# Patient Record
Sex: FEMALE | Race: WHITE | Employment: FULL TIME | URBAN - METROPOLITAN AREA
[De-identification: names, ages, dates, MRNs, and addresses within clinical notes are randomized per-mention and may not be internally consistent; named-entity substitution may affect disease eponyms.]

---

## 2018-05-29 ENCOUNTER — OFFICE VISIT (OUTPATIENT)
Dept: FAMILY MEDICINE CLINIC | Age: 38
End: 2018-05-29
Payer: MEDICAID

## 2018-05-29 ENCOUNTER — HOSPITAL ENCOUNTER (OUTPATIENT)
Age: 38
Discharge: HOME | End: 2018-05-31
Payer: MEDICAID

## 2018-05-29 VITALS
WEIGHT: 110 LBS | BODY MASS INDEX: 20.24 KG/M2 | RESPIRATION RATE: 16 BRPM | HEIGHT: 62 IN | HEART RATE: 63 BPM | DIASTOLIC BLOOD PRESSURE: 70 MMHG | OXYGEN SATURATION: 99 % | SYSTOLIC BLOOD PRESSURE: 112 MMHG

## 2018-05-29 DIAGNOSIS — K21.9 GASTROESOPHAGEAL REFLUX DISEASE, ESOPHAGITIS PRESENCE NOT SPECIFIED: ICD-10-CM

## 2018-05-29 DIAGNOSIS — Z00.00 PREVENTATIVE HEALTH CARE: Primary | ICD-10-CM

## 2018-05-29 DIAGNOSIS — E55.9 VITAMIN D DEFICIENCY: ICD-10-CM

## 2018-05-29 LAB
ALBUMIN SERPL-MCNC: 4.7 G/DL (ref 3.5–5.2)
ALP BLD-CCNC: 46 U/L (ref 35–104)
ALT SERPL-CCNC: 14 U/L (ref 0–32)
ANION GAP SERPL CALCULATED.3IONS-SCNC: 16 MMOL/L (ref 7–16)
AST SERPL-CCNC: 18 U/L (ref 0–31)
BILIRUB SERPL-MCNC: 0.3 MG/DL (ref 0–1.2)
BUN BLDV-MCNC: 8 MG/DL (ref 6–20)
CALCIUM SERPL-MCNC: 9.6 MG/DL (ref 8.6–10.2)
CHLORIDE BLD-SCNC: 102 MMOL/L (ref 98–107)
CHOLESTEROL, TOTAL: 195 MG/DL (ref 0–199)
CO2: 23 MMOL/L (ref 22–29)
CREAT SERPL-MCNC: 0.8 MG/DL (ref 0.5–1)
GFR AFRICAN AMERICAN: >60
GFR NON-AFRICAN AMERICAN: >60 ML/MIN/1.73
GLUCOSE BLD-MCNC: 90 MG/DL (ref 74–109)
HCT VFR BLD CALC: 43.3 % (ref 34–48)
HDLC SERPL-MCNC: 91 MG/DL
HEMOGLOBIN: 13.8 G/DL (ref 11.5–15.5)
LDL CHOLESTEROL CALCULATED: 95 MG/DL (ref 0–99)
MCH RBC QN AUTO: 26.6 PG (ref 26–35)
MCHC RBC AUTO-ENTMCNC: 31.9 % (ref 32–34.5)
MCV RBC AUTO: 83.6 FL (ref 80–99.9)
PDW BLD-RTO: 13.2 FL (ref 11.5–15)
PLATELET # BLD: 204 E9/L (ref 130–450)
PMV BLD AUTO: 11 FL (ref 7–12)
POTASSIUM SERPL-SCNC: 4.3 MMOL/L (ref 3.5–5)
RBC # BLD: 5.18 E12/L (ref 3.5–5.5)
SODIUM BLD-SCNC: 141 MMOL/L (ref 132–146)
TOTAL PROTEIN: 7.6 G/DL (ref 6.4–8.3)
TRIGL SERPL-MCNC: 43 MG/DL (ref 0–149)
VITAMIN D 25-HYDROXY: 14 NG/ML (ref 30–100)
VLDLC SERPL CALC-MCNC: 9 MG/DL
WBC # BLD: 4.5 E9/L (ref 4.5–11.5)

## 2018-05-29 PROCEDURE — 82306 VITAMIN D 25 HYDROXY: CPT

## 2018-05-29 PROCEDURE — 80061 LIPID PANEL: CPT

## 2018-05-29 PROCEDURE — 85027 COMPLETE CBC AUTOMATED: CPT

## 2018-05-29 PROCEDURE — 99395 PREV VISIT EST AGE 18-39: CPT | Performed by: FAMILY MEDICINE

## 2018-05-29 PROCEDURE — 80053 COMPREHEN METABOLIC PANEL: CPT

## 2018-05-29 PROCEDURE — 36415 COLL VENOUS BLD VENIPUNCTURE: CPT

## 2018-05-29 RX ORDER — OMEPRAZOLE 20 MG/1
20 CAPSULE, DELAYED RELEASE ORAL DAILY
Qty: 30 CAPSULE | Refills: 5 | Status: SHIPPED | OUTPATIENT
Start: 2018-05-29 | End: 2019-06-12 | Stop reason: SDUPTHER

## 2018-05-29 ASSESSMENT — PATIENT HEALTH QUESTIONNAIRE - PHQ9
SUM OF ALL RESPONSES TO PHQ QUESTIONS 1-9: 0
2. FEELING DOWN, DEPRESSED OR HOPELESS: 0
SUM OF ALL RESPONSES TO PHQ9 QUESTIONS 1 & 2: 0
1. LITTLE INTEREST OR PLEASURE IN DOING THINGS: 0

## 2018-05-29 ASSESSMENT — ENCOUNTER SYMPTOMS
SHORTNESS OF BREATH: 0
SORE THROAT: 0
WHEEZING: 0
DIARRHEA: 0
RHINORRHEA: 0
VOMITING: 0
ANAL BLEEDING: 0
COLOR CHANGE: 0
ABDOMINAL PAIN: 0
CONSTIPATION: 0
EYE PAIN: 0
BACK PAIN: 0
NAUSEA: 0
COUGH: 0
BLOOD IN STOOL: 0
EYE REDNESS: 0

## 2018-06-01 ENCOUNTER — TELEPHONE (OUTPATIENT)
Dept: FAMILY MEDICINE CLINIC | Age: 38
End: 2018-06-01

## 2018-06-01 DIAGNOSIS — K21.9 GASTROESOPHAGEAL REFLUX DISEASE WITHOUT ESOPHAGITIS: ICD-10-CM

## 2018-06-01 DIAGNOSIS — E55.9 VITAMIN D DEFICIENCY: ICD-10-CM

## 2018-06-01 RX ORDER — ACETAMINOPHEN 160 MG
TABLET,DISINTEGRATING ORAL DAILY
COMMUNITY
End: 2022-06-20

## 2019-06-12 ENCOUNTER — OFFICE VISIT (OUTPATIENT)
Dept: FAMILY MEDICINE CLINIC | Age: 39
End: 2019-06-12
Payer: MEDICAID

## 2019-06-12 VITALS
RESPIRATION RATE: 20 BRPM | DIASTOLIC BLOOD PRESSURE: 78 MMHG | BODY MASS INDEX: 20.61 KG/M2 | SYSTOLIC BLOOD PRESSURE: 122 MMHG | WEIGHT: 112 LBS | HEIGHT: 62 IN | HEART RATE: 72 BPM | OXYGEN SATURATION: 98 %

## 2019-06-12 DIAGNOSIS — Z00.00 PREVENTATIVE HEALTH CARE: Primary | ICD-10-CM

## 2019-06-12 DIAGNOSIS — K21.9 GASTROESOPHAGEAL REFLUX DISEASE, ESOPHAGITIS PRESENCE NOT SPECIFIED: ICD-10-CM

## 2019-06-12 DIAGNOSIS — E55.9 VITAMIN D DEFICIENCY: ICD-10-CM

## 2019-06-12 PROCEDURE — 99395 PREV VISIT EST AGE 18-39: CPT | Performed by: FAMILY MEDICINE

## 2019-06-12 RX ORDER — OMEPRAZOLE 20 MG/1
20 CAPSULE, DELAYED RELEASE ORAL DAILY
Qty: 30 CAPSULE | Refills: 5 | Status: SHIPPED
Start: 2019-06-12 | End: 2020-08-20 | Stop reason: SDUPTHER

## 2019-06-12 ASSESSMENT — PATIENT HEALTH QUESTIONNAIRE - PHQ9
SUM OF ALL RESPONSES TO PHQ QUESTIONS 1-9: 0
2. FEELING DOWN, DEPRESSED OR HOPELESS: 0
1. LITTLE INTEREST OR PLEASURE IN DOING THINGS: 0
SUM OF ALL RESPONSES TO PHQ9 QUESTIONS 1 & 2: 0
SUM OF ALL RESPONSES TO PHQ QUESTIONS 1-9: 0

## 2019-06-12 ASSESSMENT — ENCOUNTER SYMPTOMS
WHEEZING: 0
VOMITING: 0
ABDOMINAL PAIN: 0
COLOR CHANGE: 0
NAUSEA: 0
COUGH: 0
SHORTNESS OF BREATH: 0

## 2019-06-12 NOTE — PROGRESS NOTES
[facility]  [phone]   Sue Maloney MD     Patient: Anali Hendricks  YOB: 1980  Visit Date: 6/12/19    Jose Dailey is a 46y.o. year old female here today for   Chief Complaint   Patient presents with    Annual Exam       HPI  Patient is a 46year old female here for yearly physical.   No concerns. Had a tick a month ago. Very small tick. Still has a misty. No rash . No symptoms. GERD has been ok. Takes prilosec. Comes and goes. 2018 pap with Dr. Ed Box was normal.     Review of Systems   Constitutional: Negative for chills and fever. Respiratory: Negative for cough, shortness of breath and wheezing. Cardiovascular: Negative for chest pain, palpitations and leg swelling. Gastrointestinal: Negative for abdominal pain, nausea and vomiting. Genitourinary: Negative for dysuria and frequency. Musculoskeletal: Negative for arthralgias. Skin: Negative for color change and rash. Neurological: Negative for dizziness and light-headedness. Psychiatric/Behavioral: Negative for dysphoric mood and sleep disturbance. The patient is nervous/anxious. Current Outpatient Medications on File Prior to Visit   Medication Sig Dispense Refill    Cholecalciferol (VITAMIN D3) 2000 units CAPS Take by mouth daily       No current facility-administered medications on file prior to visit. Allergies   Allergen Reactions    Septra [Bactrim]     Sulfa Antibiotics Hives       Past medical, surgical, socialand/or family history reviewed, updated as needed, and are non-contributory (unless otherwise stated). Medications, allergies, and problem list also reviewed and updated as needed in patient's record.     Wt Readings from Last 3 Encounters:   06/12/19 112 lb (50.8 kg)   05/29/18 110 lb (49.9 kg)   09/21/16 140 lb (63.5 kg)                   /78   Pulse 72   Resp 20   Ht 5' 2\" (1.575 m)   Wt 112 lb (50.8 kg) LMP 06/11/2019   SpO2 98%   BMI 20.49 kg/m²        Physical Exam   Constitutional: She is oriented to person, place, and time. She appears well-developed and well-nourished. HENT:   Head: Normocephalic and atraumatic. Right Ear: External ear normal.   Left Ear: External ear normal.   Nose: Nose normal.   Mouth/Throat: Oropharynx is clear and moist. No oropharyngeal exudate. Eyes: Pupils are equal, round, and reactive to light. EOM are normal.   Cardiovascular: Normal rate, regular rhythm, normal heart sounds and intact distal pulses. Exam reveals no friction rub. No murmur heard. Pulmonary/Chest: Effort normal and breath sounds normal. No stridor. No respiratory distress. She has no wheezes. She has no rales. Abdominal: Soft. Bowel sounds are normal. She exhibits no distension and no mass. There is no tenderness. There is no guarding. Musculoskeletal: Normal range of motion. She exhibits no edema. Neurological: She is alert and oriented to person, place, and time. Skin: Skin is warm and dry. Psychiatric: She has a normal mood and affect. Her behavior is normal.   Nursing note and vitals reviewed. ASSESSMENT/PLAN  Annemarie Herrrea was seen today for annual exam.    Diagnoses and all orders for this visit:    Preventative health care   - 46year old female here for prev visit    Healthy   No issues or concerns. Up to date on pap smear. Gastroesophageal reflux disease, esophagitis presence not specified  -     omeprazole (PRILOSEC) 20 MG delayed release capsule; Take 1 capsule by mouth daily    Vitamin D deficiency  -     Vitamin D 25 Hydroxy; Future            Phone/MyChart follow up if tests abnormal.    Return in about 1 year (around 6/12/2020) for physical . or sooner if necessary. I have reviewed myfindings and recommendations with Annemarie Herrera. Yair Jaramillo.  Yanely Matthews M.D

## 2019-12-10 ASSESSMENT — ENCOUNTER SYMPTOMS
VOMITING: 0
SORE THROAT: 0
SHORTNESS OF BREATH: 0
EYE PAIN: 0
ABDOMINAL PAIN: 0
SINUS PRESSURE: 0
DIARRHEA: 0
BACK PAIN: 0
EYE REDNESS: 0
EYE DISCHARGE: 0
COUGH: 0

## 2019-12-11 ENCOUNTER — OFFICE VISIT (OUTPATIENT)
Dept: FAMILY MEDICINE CLINIC | Age: 39
End: 2019-12-11
Payer: COMMERCIAL

## 2019-12-11 ENCOUNTER — HOSPITAL ENCOUNTER (OUTPATIENT)
Age: 39
Discharge: HOME | End: 2019-12-13
Payer: COMMERCIAL

## 2019-12-11 VITALS
SYSTOLIC BLOOD PRESSURE: 136 MMHG | WEIGHT: 120 LBS | DIASTOLIC BLOOD PRESSURE: 80 MMHG | OXYGEN SATURATION: 96 % | BODY MASS INDEX: 22.08 KG/M2 | HEART RATE: 88 BPM | HEIGHT: 62 IN

## 2019-12-11 DIAGNOSIS — R53.83 FATIGUE, UNSPECIFIED TYPE: ICD-10-CM

## 2019-12-11 DIAGNOSIS — E55.9 VITAMIN D DEFICIENCY: ICD-10-CM

## 2019-12-11 DIAGNOSIS — F41.9 ANXIETY: ICD-10-CM

## 2019-12-11 DIAGNOSIS — F41.9 ANXIETY: Primary | ICD-10-CM

## 2019-12-11 DIAGNOSIS — R63.1 POLYDIPSIA: ICD-10-CM

## 2019-12-11 DIAGNOSIS — D64.9 ANEMIA, UNSPECIFIED TYPE: ICD-10-CM

## 2019-12-11 DIAGNOSIS — N92.6 IRREGULAR MENSES: ICD-10-CM

## 2019-12-11 DIAGNOSIS — G44.209 TENSION-TYPE HEADACHE, NOT INTRACTABLE, UNSPECIFIED CHRONICITY PATTERN: ICD-10-CM

## 2019-12-11 DIAGNOSIS — M79.10 MYALGIA: ICD-10-CM

## 2019-12-11 LAB
ALBUMIN SERPL-MCNC: 5 G/DL (ref 3.5–5.2)
ALP BLD-CCNC: 58 U/L (ref 35–104)
ALT SERPL-CCNC: 14 U/L (ref 0–32)
ANION GAP SERPL CALCULATED.3IONS-SCNC: 16 MMOL/L (ref 7–16)
AST SERPL-CCNC: 18 U/L (ref 0–31)
BASOPHILS ABSOLUTE: 0.05 E9/L (ref 0–0.2)
BASOPHILS RELATIVE PERCENT: 0.8 % (ref 0–2)
BILIRUB SERPL-MCNC: 0.3 MG/DL (ref 0–1.2)
BUN BLDV-MCNC: 9 MG/DL (ref 6–20)
CALCIUM SERPL-MCNC: 9.9 MG/DL (ref 8.6–10.2)
CHLORIDE BLD-SCNC: 104 MMOL/L (ref 98–107)
CHP ED QC CHECK: NORMAL
CO2: 21 MMOL/L (ref 22–29)
CREAT SERPL-MCNC: 0.8 MG/DL (ref 0.5–1)
EOSINOPHILS ABSOLUTE: 0.03 E9/L (ref 0.05–0.5)
EOSINOPHILS RELATIVE PERCENT: 0.5 % (ref 0–6)
FERRITIN: 21 NG/ML
FOLATE: 18.4 NG/ML (ref 4.8–24.2)
GFR AFRICAN AMERICAN: >60
GFR NON-AFRICAN AMERICAN: >60 ML/MIN/1.73
GLUCOSE BLD-MCNC: 87 MG/DL
GLUCOSE FASTING: 94 MG/DL (ref 74–99)
GONADOTROPIN, CHORIONIC (HCG) QUANT: <0.1 MIU/ML
HBA1C MFR BLD: 5 %
HCT VFR BLD CALC: 41.8 % (ref 34–48)
HEMOGLOBIN: 13.3 G/DL (ref 11.5–15.5)
IMMATURE GRANULOCYTES #: 0.02 E9/L
IMMATURE GRANULOCYTES %: 0.3 % (ref 0–5)
IRON SATURATION: 11 % (ref 15–50)
IRON: 42 MCG/DL (ref 37–145)
LYMPHOCYTES ABSOLUTE: 1.62 E9/L (ref 1.5–4)
LYMPHOCYTES RELATIVE PERCENT: 25.6 % (ref 20–42)
MAGNESIUM: 2.1 MG/DL (ref 1.6–2.6)
MCH RBC QN AUTO: 25.7 PG (ref 26–35)
MCHC RBC AUTO-ENTMCNC: 31.8 % (ref 32–34.5)
MCV RBC AUTO: 80.7 FL (ref 80–99.9)
MONOCYTES ABSOLUTE: 0.26 E9/L (ref 0.1–0.95)
MONOCYTES RELATIVE PERCENT: 4.1 % (ref 2–12)
NEUTROPHILS ABSOLUTE: 4.36 E9/L (ref 1.8–7.3)
NEUTROPHILS RELATIVE PERCENT: 68.7 % (ref 43–80)
PDW BLD-RTO: 12.7 FL (ref 11.5–15)
PLATELET # BLD: 295 E9/L (ref 130–450)
PMV BLD AUTO: 11 FL (ref 7–12)
POTASSIUM SERPL-SCNC: 3.7 MMOL/L (ref 3.5–5)
RBC # BLD: 5.18 E12/L (ref 3.5–5.5)
RHEUMATOID FACTOR: <10 IU/ML (ref 0–13)
SEDIMENTATION RATE, ERYTHROCYTE: 7 MM/HR (ref 0–20)
SODIUM BLD-SCNC: 141 MMOL/L (ref 132–146)
T4 FREE: 1.27 NG/DL (ref 0.93–1.7)
TOTAL IRON BINDING CAPACITY: 380 MCG/DL (ref 250–450)
TOTAL PROTEIN: 8.4 G/DL (ref 6.4–8.3)
TSH SERPL DL<=0.05 MIU/L-ACNC: 1.27 UIU/ML (ref 0.27–4.2)
VITAMIN B-12: 463 PG/ML (ref 211–946)
VITAMIN D 25-HYDROXY: 23 NG/ML (ref 30–100)
WBC # BLD: 6.3 E9/L (ref 4.5–11.5)

## 2019-12-11 PROCEDURE — 99214 OFFICE O/P EST MOD 30 MIN: CPT | Performed by: PHYSICIAN ASSISTANT

## 2019-12-11 PROCEDURE — 83036 HEMOGLOBIN GLYCOSYLATED A1C: CPT | Performed by: PHYSICIAN ASSISTANT

## 2019-12-11 PROCEDURE — 83540 ASSAY OF IRON: CPT

## 2019-12-11 PROCEDURE — 84702 CHORIONIC GONADOTROPIN TEST: CPT

## 2019-12-11 PROCEDURE — 85651 RBC SED RATE NONAUTOMATED: CPT

## 2019-12-11 PROCEDURE — 82306 VITAMIN D 25 HYDROXY: CPT

## 2019-12-11 PROCEDURE — 84443 ASSAY THYROID STIM HORMONE: CPT

## 2019-12-11 PROCEDURE — 82746 ASSAY OF FOLIC ACID SERUM: CPT

## 2019-12-11 PROCEDURE — 83550 IRON BINDING TEST: CPT

## 2019-12-11 PROCEDURE — 80053 COMPREHEN METABOLIC PANEL: CPT

## 2019-12-11 PROCEDURE — 82728 ASSAY OF FERRITIN: CPT

## 2019-12-11 PROCEDURE — 83735 ASSAY OF MAGNESIUM: CPT

## 2019-12-11 PROCEDURE — 86431 RHEUMATOID FACTOR QUANT: CPT

## 2019-12-11 PROCEDURE — 86038 ANTINUCLEAR ANTIBODIES: CPT

## 2019-12-11 PROCEDURE — 85025 COMPLETE CBC W/AUTO DIFF WBC: CPT

## 2019-12-11 PROCEDURE — 82962 GLUCOSE BLOOD TEST: CPT | Performed by: PHYSICIAN ASSISTANT

## 2019-12-11 PROCEDURE — 82607 VITAMIN B-12: CPT

## 2019-12-11 PROCEDURE — 84439 ASSAY OF FREE THYROXINE: CPT

## 2019-12-11 PROCEDURE — 36415 COLL VENOUS BLD VENIPUNCTURE: CPT

## 2019-12-11 RX ORDER — BUSPIRONE HYDROCHLORIDE 5 MG/1
5 TABLET ORAL 2 TIMES DAILY
Qty: 60 TABLET | Refills: 1 | Status: SHIPPED | OUTPATIENT
Start: 2019-12-11 | End: 2020-01-10

## 2019-12-11 RX ORDER — ESCITALOPRAM OXALATE 10 MG/1
10 TABLET ORAL DAILY
Qty: 30 TABLET | Refills: 1 | Status: SHIPPED | OUTPATIENT
Start: 2019-12-11 | End: 2022-07-13 | Stop reason: ALTCHOICE

## 2019-12-11 ASSESSMENT — ENCOUNTER SYMPTOMS
PHOTOPHOBIA: 0
TROUBLE SWALLOWING: 0
WHEEZING: 0
CHEST TIGHTNESS: 1
STRIDOR: 0
BLOOD IN STOOL: 0
COLOR CHANGE: 0
CONSTIPATION: 0
NAUSEA: 1

## 2019-12-12 LAB — ANTI-NUCLEAR ANTIBODY (ANA): NEGATIVE

## 2020-03-25 ENCOUNTER — PATIENT MESSAGE (OUTPATIENT)
Dept: FAMILY MEDICINE CLINIC | Age: 40
End: 2020-03-25

## 2020-03-25 NOTE — LETTER
[address]  [address] [id number] [place] [address]  Phone: [phone]  Fax: Bob Howard. MD Jake        March 26, 2020    Ignacio Lewis  [place] [id number]      To Whom It May Concern,     The above named patient is in  my care and should be excused from work until 3/30/2020 for health reasons. If you have any questions or concerns, please don't hesitate to call. Sincerely,        Dary Staton.  Torri Juarez MD

## 2020-03-25 NOTE — TELEPHONE ENCOUNTER
From: Rory Pryor  To: Stormy Ayala. Irma Cedeno MD  Sent: 3/25/2020 11:43 AM EDT  Subject: Non-Urgent Medical Question    I have been having worse and worse problems with my anxiety recently and now with this virus going around it has reached a point where I have been missing work. I'm sure its probably due to my anxiety, but I have been having headaches, stomachaches, insomnia and a hard time concentrating when I'm at work. I have been trying to stay away from people and at home as much as possible with eveything going on but I wasnt sure what to do about work because I have already used my sick days due to my anxiety and if I miss anymore days I need a doctor's excuse. Is there anyway I could get an excuse for the rest of the week for work to have time to try to get my anxiety under control? Thank you.

## 2022-06-20 ENCOUNTER — OFFICE VISIT (OUTPATIENT)
Dept: FAMILY MEDICINE CLINIC | Age: 42
End: 2022-06-20
Payer: MEDICAID

## 2022-06-20 VITALS
HEART RATE: 69 BPM | HEIGHT: 63 IN | OXYGEN SATURATION: 100 % | WEIGHT: 105 LBS | RESPIRATION RATE: 16 BRPM | BODY MASS INDEX: 18.61 KG/M2 | DIASTOLIC BLOOD PRESSURE: 73 MMHG | TEMPERATURE: 97.7 F | SYSTOLIC BLOOD PRESSURE: 111 MMHG

## 2022-06-20 DIAGNOSIS — K21.9 GASTROESOPHAGEAL REFLUX DISEASE, UNSPECIFIED WHETHER ESOPHAGITIS PRESENT: ICD-10-CM

## 2022-06-20 DIAGNOSIS — E55.9 VITAMIN D DEFICIENCY: ICD-10-CM

## 2022-06-20 DIAGNOSIS — R00.2 PALPITATIONS: ICD-10-CM

## 2022-06-20 DIAGNOSIS — R00.2 PALPITATIONS: Primary | ICD-10-CM

## 2022-06-20 LAB
ALBUMIN SERPL-MCNC: 5 G/DL (ref 3.5–5.2)
ALP BLD-CCNC: 57 U/L (ref 35–104)
ALT SERPL-CCNC: 14 U/L (ref 0–32)
ANION GAP SERPL CALCULATED.3IONS-SCNC: 17 MMOL/L (ref 7–16)
AST SERPL-CCNC: 18 U/L (ref 0–31)
BASOPHILS ABSOLUTE: 0.03 E9/L (ref 0–0.2)
BASOPHILS RELATIVE PERCENT: 0.7 % (ref 0–2)
BILIRUB SERPL-MCNC: 0.4 MG/DL (ref 0–1.2)
BUN BLDV-MCNC: 7 MG/DL (ref 6–20)
CALCIUM SERPL-MCNC: 9.4 MG/DL (ref 8.6–10.2)
CHLORIDE BLD-SCNC: 103 MMOL/L (ref 98–107)
CHOLESTEROL, TOTAL: 205 MG/DL (ref 0–199)
CO2: 21 MMOL/L (ref 22–29)
CREAT SERPL-MCNC: 0.9 MG/DL (ref 0.5–1)
EOSINOPHILS ABSOLUTE: 0.04 E9/L (ref 0.05–0.5)
EOSINOPHILS RELATIVE PERCENT: 1 % (ref 0–6)
GFR AFRICAN AMERICAN: >60
GFR NON-AFRICAN AMERICAN: >60 ML/MIN/1.73
GLUCOSE BLD-MCNC: 85 MG/DL (ref 74–99)
HCT VFR BLD CALC: 44.8 % (ref 34–48)
HDLC SERPL-MCNC: 83 MG/DL
HEMOGLOBIN: 14.2 G/DL (ref 11.5–15.5)
IMMATURE GRANULOCYTES #: 0.01 E9/L
IMMATURE GRANULOCYTES %: 0.2 % (ref 0–5)
LDL CHOLESTEROL CALCULATED: 109 MG/DL (ref 0–99)
LYMPHOCYTES ABSOLUTE: 1.37 E9/L (ref 1.5–4)
LYMPHOCYTES RELATIVE PERCENT: 33.1 % (ref 20–42)
MCH RBC QN AUTO: 25.9 PG (ref 26–35)
MCHC RBC AUTO-ENTMCNC: 31.7 % (ref 32–34.5)
MCV RBC AUTO: 81.8 FL (ref 80–99.9)
MONOCYTES ABSOLUTE: 0.21 E9/L (ref 0.1–0.95)
MONOCYTES RELATIVE PERCENT: 5.1 % (ref 2–12)
NEUTROPHILS ABSOLUTE: 2.48 E9/L (ref 1.8–7.3)
NEUTROPHILS RELATIVE PERCENT: 59.9 % (ref 43–80)
PDW BLD-RTO: 12.9 FL (ref 11.5–15)
PLATELET # BLD: 243 E9/L (ref 130–450)
PMV BLD AUTO: 11.5 FL (ref 7–12)
POTASSIUM SERPL-SCNC: 3.6 MMOL/L (ref 3.5–5)
RBC # BLD: 5.48 E12/L (ref 3.5–5.5)
SODIUM BLD-SCNC: 141 MMOL/L (ref 132–146)
TOTAL PROTEIN: 8.1 G/DL (ref 6.4–8.3)
TRIGL SERPL-MCNC: 66 MG/DL (ref 0–149)
TSH SERPL DL<=0.05 MIU/L-ACNC: 0.14 UIU/ML (ref 0.27–4.2)
VITAMIN D 25-HYDROXY: 23 NG/ML (ref 30–100)
VLDLC SERPL CALC-MCNC: 13 MG/DL
WBC # BLD: 4.1 E9/L (ref 4.5–11.5)

## 2022-06-20 PROCEDURE — 36415 COLL VENOUS BLD VENIPUNCTURE: CPT | Performed by: FAMILY MEDICINE

## 2022-06-20 PROCEDURE — 93010 ELECTROCARDIOGRAM REPORT: CPT | Performed by: FAMILY MEDICINE

## 2022-06-20 PROCEDURE — 99212 OFFICE O/P EST SF 10 MIN: CPT | Performed by: STUDENT IN AN ORGANIZED HEALTH CARE EDUCATION/TRAINING PROGRAM

## 2022-06-20 PROCEDURE — G8420 CALC BMI NORM PARAMETERS: HCPCS | Performed by: FAMILY MEDICINE

## 2022-06-20 PROCEDURE — 99213 OFFICE O/P EST LOW 20 MIN: CPT | Performed by: FAMILY MEDICINE

## 2022-06-20 PROCEDURE — 1036F TOBACCO NON-USER: CPT | Performed by: FAMILY MEDICINE

## 2022-06-20 PROCEDURE — 93005 ELECTROCARDIOGRAM TRACING: CPT | Performed by: STUDENT IN AN ORGANIZED HEALTH CARE EDUCATION/TRAINING PROGRAM

## 2022-06-20 PROCEDURE — G8427 DOCREV CUR MEDS BY ELIG CLIN: HCPCS | Performed by: FAMILY MEDICINE

## 2022-06-20 RX ORDER — OMEPRAZOLE 20 MG/1
20 CAPSULE, DELAYED RELEASE ORAL DAILY
Qty: 30 CAPSULE | Refills: 3 | Status: SHIPPED | OUTPATIENT
Start: 2022-06-20

## 2022-06-20 SDOH — ECONOMIC STABILITY: FOOD INSECURITY: WITHIN THE PAST 12 MONTHS, THE FOOD YOU BOUGHT JUST DIDN'T LAST AND YOU DIDN'T HAVE MONEY TO GET MORE.: NEVER TRUE

## 2022-06-20 SDOH — ECONOMIC STABILITY: FOOD INSECURITY: WITHIN THE PAST 12 MONTHS, YOU WORRIED THAT YOUR FOOD WOULD RUN OUT BEFORE YOU GOT MONEY TO BUY MORE.: NEVER TRUE

## 2022-06-20 ASSESSMENT — PATIENT HEALTH QUESTIONNAIRE - PHQ9
SUM OF ALL RESPONSES TO PHQ QUESTIONS 1-9: 0
SUM OF ALL RESPONSES TO PHQ QUESTIONS 1-9: 0
2. FEELING DOWN, DEPRESSED OR HOPELESS: 0
1. LITTLE INTEREST OR PLEASURE IN DOING THINGS: 0
SUM OF ALL RESPONSES TO PHQ9 QUESTIONS 1 & 2: 0
SUM OF ALL RESPONSES TO PHQ QUESTIONS 1-9: 0
SUM OF ALL RESPONSES TO PHQ QUESTIONS 1-9: 0

## 2022-06-20 ASSESSMENT — LIFESTYLE VARIABLES: HOW OFTEN DO YOU HAVE A DRINK CONTAINING ALCOHOL: NEVER

## 2022-06-20 ASSESSMENT — SOCIAL DETERMINANTS OF HEALTH (SDOH): HOW HARD IS IT FOR YOU TO PAY FOR THE VERY BASICS LIKE FOOD, HOUSING, MEDICAL CARE, AND HEATING?: SOMEWHAT HARD

## 2022-06-20 NOTE — PROGRESS NOTES
[address] [facility]  FAMILY MEDICINE RESIDENCY PROGRAM  DATE OF VISIT : 2022    Patient : Brian Ferreira   Age : 40 y.o.  : 1980   MRN : [id number]   ______________________________________________________________________    Chief Complaint :   Chief Complaint   Patient presents with    Gastroesophageal Reflux    Palpitations     with chest tightness       HPI : Brian Ferreira is 40 y.o. female who presented to the clinic today for GERD follow up and palpitations with chest tightness. Hx of GERD, has not taken omeprazole for about a week. Has noted \"weird\" sensation in her chest that she thought maybe attributable to reflux. Did have reflux last night. Denies post prandial and nighttime cough. Has had this sensation for about a week and a half, often in the morning. Slight chest tightness with breathing. Notes she has also been doing more house work, so thought it could be related to MSK. Chest discomfort started after palpitations began about 2 weeks ago. Has had prior palpitations, but notes that these felt a little different than prior and lasts for a couple minutes at a time. Does not occur every day, and is sporadic without trigger. Denies other SOB, chest heaviness/pain, cough dizziness, or headaches. Past Medical History :  Past Medical History:   Diagnosis Date    Acid reflux     Anxiety     Vitamin D deficiency 2018     Past Surgical History:   Procedure Laterality Date    TONSILLECTOMY         Allergies :    Allergies   Allergen Reactions    Septra [Bactrim]     Sulfa Antibiotics Hives       Medication List :    Current Outpatient Medications   Medication Sig Dispense Refill    omeprazole (PRILOSEC) 20 MG delayed release capsule Take 1 capsule by mouth daily 30 capsule 3    Multiple Vitamin (MULTI-VITAMIN DAILY PO) Take by mouth      escitalopram (LEXAPRO) 10 MG tablet Take 1 tablet by mouth daily 30 tablet 1     No current facility-administered medications for this visit.        ______________________________________________________________________    Physical Exam :    Vitals: /73   Pulse 69   Temp 97.7 °F (36.5 °C) (Temporal)   Resp 16   Ht 5' 3\" (1.6 m)   Wt 105 lb (47.6 kg)   LMP 06/15/2022 (Exact Date)   SpO2 100%   BMI 18.60 kg/m²   General Appearance: Awake, alert, oriented, and in NAD  HEENT: NCAT, no pallor or icterus  Neck: Symmetrical, trachea midline. No thyroid nodules appreciated  Chest wall/Lung: CTAB, respirations unlabored. No ronchi/wheezing/rales; no reproducible chest discomfort on exam  Heart: RRR, normal S1 and S2, no murmurs, rubs or gallops  Abdomen: SNTND  Extremities: Extremities normal, atraumatic, no cyanosis, clubbing or edema. Neurologic: Alert&Oriented x3. No focal motor deficits detected   Psychiatric: Normal mood. Normal affect. Normal behavior  ______________________________________________________________________    Assessment & Plan :    1. Gastroesophageal reflux disease  · Refill:  - omeprazole (PRILOSEC) 20 MG delayed release capsule; Take 1 capsule by mouth daily  Dispense: 30 capsule; Refill: 3  - CBC with Auto Differential; Future  - Comprehensive Metabolic Panel; Future  - LIPID PANEL; Future    2. Palpitations  · Shortened VA interval on EKG, 24 holter monitor, labs  - CBC with Auto Differential; Future  - Comprehensive Metabolic Panel; Future  - TSH; Future  - EKG 12 lead showed shortened VA interval, no changes to QRS complex. - 24 hour Holter monitor    3. Vitamin D deficiency  - Vitamin D 25 Hydroxy; Future      Additional plan and future considerations:       Return to Office: Return in about 4 weeks (around 7/18/2022) for GERD, palpitations.     Alger Soulier, DO   Case discussed with Dr. Diedra Rubinstein

## 2022-06-20 NOTE — PROGRESS NOTES
S: 40 y.o. female presents today for Gastroesophageal Reflux and Palpitations (with chest tightness)    GERD: on omeprazole; requesting refill  1 week chest tightness, \"weird with breathing\", out of omeprazole; non-exertional symptoms; palpitations started about 2 weeks ago, felt like heart flipping; sporadic and resolves in minutes  Father with thyroid disease      O: VS: /73   Pulse 69   Temp 97.7 °F (36.5 °C) (Temporal)   Resp 16   Ht 5' 3\" (1.6 m)   Wt 105 lb (47.6 kg)   LMP 06/15/2022 (Exact Date)   SpO2 100%   BMI 18.60 kg/m²   AAO/NAD, appropriate affect for mood  CV:  RRR, no murmur  Resp: CTAB  Abdomen: SNTND  Ext: no edema    Assessment/Plan:   1) GERD - restart omeprazole  2) Palpitations - EKG today, obtain Holter; labs as ordered  3) Chest tightness - EKG today sinus rhythm, short pr; labs as ordered; non-anginal chest pain; [place] Heart Score (MHS):  1; Pretest probability: Very Low; continue with symptom monitoring; resume omeprazole at this time  4) HM as ordered  RTO: 1 month f/u    Attending Physician Statement  I have discussed the case, including pertinent history and exam findings with the resident. I agree with the documented assessment and plan.       Electronically signed by Eva Vee MD on 6/20/2022 at 1:43 PM

## 2022-06-22 NOTE — RESULT ENCOUNTER NOTE
TSH 0.135, previously normal. No known thyroid hx. Will need repeat TSH with T4. Vit D low at 23, consider OTC Vit D supplementation. Cholesterol 205, , HDL 83    The 10-year ASCVD risk score (Angela Montiel et al., 2013) is: 0.2%    Values used to calculate the score:      Age: 40 years      Sex: Female      Is Non- : No      Diabetic: No      Tobacco smoker: No      Systolic Blood Pressure: 983 mmHg      Is BP treated: No      HDL Cholesterol: 83 mg/dL      Total Cholesterol: 205 mg/dL   Consider dietary changes. WBC 4.1, continue to monitor. Attempted call to patient, voicemail name did not match contacts.

## 2022-07-11 ENCOUNTER — HOSPITAL ENCOUNTER (OUTPATIENT)
Dept: SLEEP CENTER | Age: 42
Discharge: HOME | End: 2022-07-11
Payer: MEDICAID

## 2022-07-11 DIAGNOSIS — R00.2 PALPITATIONS: ICD-10-CM

## 2022-07-11 PROCEDURE — 93226 XTRNL ECG REC<48 HR SCAN A/R: CPT

## 2022-07-11 PROCEDURE — 93225 XTRNL ECG REC<48 HRS REC: CPT

## 2022-07-13 ENCOUNTER — OFFICE VISIT (OUTPATIENT)
Dept: FAMILY MEDICINE CLINIC | Age: 42
End: 2022-07-13
Payer: MEDICAID

## 2022-07-13 VITALS
HEART RATE: 102 BPM | HEIGHT: 62 IN | OXYGEN SATURATION: 100 % | BODY MASS INDEX: 19.14 KG/M2 | DIASTOLIC BLOOD PRESSURE: 60 MMHG | TEMPERATURE: 97.9 F | WEIGHT: 104 LBS | SYSTOLIC BLOOD PRESSURE: 98 MMHG | RESPIRATION RATE: 16 BRPM

## 2022-07-13 DIAGNOSIS — R94.31 SHORTENED PR INTERVAL: ICD-10-CM

## 2022-07-13 DIAGNOSIS — Z11.59 ENCOUNTER FOR HEPATITIS C SCREENING TEST FOR LOW RISK PATIENT: ICD-10-CM

## 2022-07-13 DIAGNOSIS — R00.2 PALPITATIONS: Primary | ICD-10-CM

## 2022-07-13 DIAGNOSIS — Z11.4 ENCOUNTER FOR SCREENING FOR HIV: ICD-10-CM

## 2022-07-13 DIAGNOSIS — R00.2 PALPITATIONS: ICD-10-CM

## 2022-07-13 PROCEDURE — 1036F TOBACCO NON-USER: CPT | Performed by: FAMILY MEDICINE

## 2022-07-13 PROCEDURE — 36415 COLL VENOUS BLD VENIPUNCTURE: CPT | Performed by: FAMILY MEDICINE

## 2022-07-13 PROCEDURE — 99213 OFFICE O/P EST LOW 20 MIN: CPT | Performed by: FAMILY MEDICINE

## 2022-07-13 PROCEDURE — G8420 CALC BMI NORM PARAMETERS: HCPCS | Performed by: FAMILY MEDICINE

## 2022-07-13 PROCEDURE — G8427 DOCREV CUR MEDS BY ELIG CLIN: HCPCS | Performed by: FAMILY MEDICINE

## 2022-07-13 NOTE — Clinical Note
Hello! This chart is still open since July. Please let me know of any issues in getting this closed. Thank you!

## 2022-07-13 NOTE — PROGRESS NOTES
[facility] [facility]  FAMILY MEDICINE RESIDENCY PROGRAM  DATE OF VISIT : 7/15/2022    Patient : Julieth Styles   Age : 40 y.o.  : 1980   MRN : [id number]   ______________________________________________________________________    Chief Complaint :   Chief Complaint   Patient presents with    Results     labs       HPI : Julieth Styles is 40 y.o. female who presented to the clinic today for GERD follow up and palpitations with chest tightness. Hx of GERD, has been back on her PPI now. Symptoms improved. Prior: Had noted \"weird\" sensation in her chest that she thought maybe attributable to reflux. Did have reflux sx. Denies post prandial and nighttime cough. No further chest discomfort. Had 24 hour holter but didn't have symptoms during the test. Still having nearly daily palpitations. Significant family history of thyroid disorder. Does feel on edge and sweaty often. Has had prior palpitations, but notes that these felt a little different than prior and lasts for a couple minutes at a time. Does not occur every day, and is sporadic without trigger. Denies other SOB, chest heaviness/pain, cough dizziness, or headaches. Past Medical History :  Past Medical History:   Diagnosis Date    Acid reflux     Anxiety     Vitamin D deficiency 2018     Past Surgical History:   Procedure Laterality Date    TONSILLECTOMY         Allergies :    Allergies   Allergen Reactions    Septra [Bactrim]     Sulfa Antibiotics Hives       Medication List :    Current Outpatient Medications   Medication Sig Dispense Refill    omeprazole (PRILOSEC) 20 MG delayed release capsule Take 1 capsule by mouth daily 30 capsule 3    Multiple Vitamin (MULTI-VITAMIN DAILY PO) Take by mouth       No current facility-administered medications for this visit.        ______________________________________________________________________    Physical Exam :    Vitals: BP 98/60   Pulse (!) 102   Temp 97.9 °F (36.6 °C) (Temporal)   Resp 16   Ht 5' 2\" (1.575 m)   Wt 104 lb (47.2 kg)   LMP 07/08/2022 (Exact Date)   SpO2 100%   BMI 19.02 kg/m²   General Appearance: Awake, alert, oriented, and in NAD  HEENT: NCAT, no pallor or icterus  Neck: Symmetrical, trachea midline. No thyroid nodules appreciated  Chest wall/Lung: CTAB, respirations unlabored. No ronchi/wheezing/rales; no reproducible chest discomfort on exam  Heart: RRR, normal S1 and S2, no murmurs, rubs or gallops  Abdomen: SNTND  Extremities: Extremities normal, atraumatic, no cyanosis, clubbing or edema. Neurologic: Alert&Oriented x3. No focal motor deficits detected   Psychiatric: Normal mood. Normal affect. Normal behavior  ______________________________________________________________________    Assessment & Plan :    1. Gastroesophageal reflux disease  Refill:  - omeprazole (PRILOSEC) 20 MG delayed release capsule; Take 1 capsule by mouth daily  Dispense: 30 capsule; Refill: 3    2. Palpitations  Shortened NH interval on EKG, 24 holter monitor, labs  - TSH; low with normal T4  - EKG 12 lead showed shortened NH interval, no changes to QRS complex. - 24 hour Holter monitor read pending  Referral to cardio given symptoms and likely need for >24 hr holter        Additional plan and future considerations:       Return to Office: Return in about 6 weeks (around 8/24/2022).     Lary Navarro MD   Case discussed with Dr. Obdulia Lopez

## 2022-07-13 NOTE — PROGRESS NOTES
S: 40 y.o. female presents today for:   GERD and lab follow-up. Palpitations intermittently. No chest pain. Did Holter monitor. Awaiting results. TSH low. Family history of thyroid disease. O: VS: BP 98/60   Pulse (!) 102   Temp 97.9 °F (36.6 °C) (Temporal)   Resp 16   Ht 5' 2\" (1.575 m)   Wt 104 lb (47.2 kg)   LMP 07/08/2022 (Exact Date)   SpO2 100%   BMI 19.02 kg/m²   AAO/NAD, appropriate affect for mood  CV:  RRR, no murmur  Resp: CTAB  Ext- DPP-B, no clubbing, cyanosis, edema    Impression/Plan:   1) GERD- stable  2) palpitations/low TSH- repeat TSH, T4, await Holter  3) short IL- refer to Cardio    Attending Physician Statement  I have discussed the case, including pertinent history and exam findings with the resident. I agree with the documented assessment and plan.       Kimberlyn Machuca, DO

## 2022-07-14 ENCOUNTER — TELEPHONE (OUTPATIENT)
Dept: ADMINISTRATIVE | Age: 42
End: 2022-07-14

## 2022-07-14 LAB
HEPATITIS C ANTIBODY INTERPRETATION: NORMAL
HIV-1 AND HIV-2 ANTIBODIES: NORMAL
T4 FREE: 1.26 NG/DL (ref 0.93–1.7)
TSH SERPL DL<=0.05 MIU/L-ACNC: 0.17 UIU/ML (ref 0.27–4.2)

## 2022-07-14 NOTE — TELEPHONE ENCOUNTER
NP scheduled from the [facility]. Patient Appointment Form:      PCP: Dr. Poly Kamara    Referring: Dr. Jay Mello     Has the Patient:    Seen a Cardiologist? No    Had a heart catheterization? no    Had heart surgery? no    Had a stress test or nuclear stress test? no    Had an echocardiogram? no    Had a vascular ultrasound? no    Had a 24/48 heart monitor or extended cardiac event monitor? Yes 7/11/22 24 HR [facility]     Had recent blood work in the last 6 months? Yes 6/20/22 [facility] PCP     Had a pacemaker/ICD/ILR implant? no    Seen an Electrophysiologist? no        Will send records via: Prior HM in Epic - PCP recs in Epic - no other recs/or hx.         Date & time of appointment: 8/9/22 @ 8:30 with Dr. Rachelle Michelle - \"Julio C\"

## 2022-07-15 ENCOUNTER — TELEPHONE (OUTPATIENT)
Dept: FAMILY MEDICINE CLINIC | Age: 42
End: 2022-07-15

## 2022-07-15 DIAGNOSIS — R79.89 LOW TSH LEVEL: Primary | ICD-10-CM

## 2022-07-15 NOTE — TELEPHONE ENCOUNTER
Notified patient of her lab results and  I explained to patient about the us and more labs  Patient agreed and I gave her the number to schedule the US and she will have her labs done with the [facility].

## 2022-07-15 NOTE — TELEPHONE ENCOUNTER
Will check thyroid antibodies and ultrasound. Labs overall seem subclinical, but given symptoms and family history, will check to autoimmune cause.     Electronically signed by Ria Mendoza MD on 7/15/22 at 1:56 PM EDT

## 2022-07-23 ENCOUNTER — HOSPITAL ENCOUNTER (OUTPATIENT)
Dept: ULTRASOUND IMAGING | Age: 42
Discharge: HOME | End: 2022-07-23
Payer: MEDICAID

## 2022-07-23 DIAGNOSIS — R79.89 LOW TSH LEVEL: ICD-10-CM

## 2022-07-23 PROCEDURE — 76536 US EXAM OF HEAD AND NECK: CPT

## 2022-08-09 ENCOUNTER — OFFICE VISIT (OUTPATIENT)
Dept: CARDIOLOGY CLINIC | Age: 42
End: 2022-08-09
Payer: MEDICAID

## 2022-08-09 VITALS
SYSTOLIC BLOOD PRESSURE: 104 MMHG | HEART RATE: 62 BPM | RESPIRATION RATE: 16 BRPM | DIASTOLIC BLOOD PRESSURE: 58 MMHG | BODY MASS INDEX: 17.89 KG/M2 | HEIGHT: 63 IN | OXYGEN SATURATION: 98 % | WEIGHT: 101 LBS

## 2022-08-09 DIAGNOSIS — R79.89 LOW TSH LEVEL: ICD-10-CM

## 2022-08-09 DIAGNOSIS — I51.9 HEART PROBLEM: ICD-10-CM

## 2022-08-09 DIAGNOSIS — R00.2 PALPITATIONS: Primary | ICD-10-CM

## 2022-08-09 DIAGNOSIS — E78.49 OTHER HYPERLIPIDEMIA: ICD-10-CM

## 2022-08-09 PROCEDURE — 93000 ELECTROCARDIOGRAM COMPLETE: CPT | Performed by: INTERNAL MEDICINE

## 2022-08-09 PROCEDURE — G8427 DOCREV CUR MEDS BY ELIG CLIN: HCPCS | Performed by: INTERNAL MEDICINE

## 2022-08-09 PROCEDURE — G8419 CALC BMI OUT NRM PARAM NOF/U: HCPCS | Performed by: INTERNAL MEDICINE

## 2022-08-09 PROCEDURE — 1036F TOBACCO NON-USER: CPT | Performed by: INTERNAL MEDICINE

## 2022-08-09 PROCEDURE — 99204 OFFICE O/P NEW MOD 45 MIN: CPT | Performed by: INTERNAL MEDICINE

## 2022-08-09 NOTE — PROGRESS NOTES
CHIEF COMPLAINT:   Chief Complaint   Patient presents with    Heart Problem     NP per Elliott DX palpitations. Patient has no complaints. HISTORY OF PRESENT ILLNESS: Patient is a 40 y.o. female seen at the request of Azul Joseph MD to establish care with me. She has a history of GERD, no prior significant cardiac history, anxiety not on medications. She has been referred to me for further evaluation of the above-mentioned complaints. She has been having palpitations for 2 months now. She describes it as a skipped beat. Occasional fluttering sensation. Not associated with dizziness, lightheadedness falls or loss of consciousness. At today's office visit, She denies any chest pain, shortness of breath,  pedal edema. The patient is capable of activities of daily living. There is dyspnea on more than moderate exertion. There is no orthopnea or PND. She is compliant with medications as well as diet and exercise regimen. Past Medical History:   Diagnosis Date    Acid reflux     Anxiety     Other hyperlipidemia 8/14/2022    Vitamin D deficiency 6/1/2018       Allergies   Allergen Reactions    Septra [Bactrim]     Sulfa Antibiotics Hives       Current Outpatient Medications   Medication Sig Dispense Refill    omeprazole (PRILOSEC) 20 MG delayed release capsule Take 1 capsule by mouth daily 30 capsule 3    Multiple Vitamin (MULTI-VITAMIN DAILY PO) Take by mouth       No current facility-administered medications for this visit.        Social History     Socioeconomic History    Marital status: Single     Spouse name: Not on file    Number of children: Not on file    Years of education: Not on file    Highest education level: Not on file   Occupational History    Not on file   Tobacco Use    Smoking status: Never    Smokeless tobacco: Never   Substance and Sexual Activity    Alcohol use: No     Comment: (1/26/16):  no EtOH    Drug use: No    Sexual activity: Yes     Partners: Male Comment: (1/26/16)monogamous relationship since 1998; condoms. not . have been together for 20 years    Other Topics Concern    Not on file   Social History Narrative    Not on file     Social Determinants of Health     Financial Resource Strain: Medium Risk    Difficulty of Paying Living Expenses: Somewhat hard   Food Insecurity: No Food Insecurity    Worried About Running Out of Food in the Last Year: Never true    Ran Out of Food in the Last Year: Never true   Transportation Needs: Not on file   Physical Activity: Not on file   Stress: Not on file   Social Connections: Not on file   Intimate Partner Violence: Not on file   Housing Stability: Not on file       Family History   Problem Relation Age of Onset    Cancer Mother         lung ca    High Cholesterol Mother     Depression Mother     Anxiety Disorder Mother     Substance Abuse Mother         tobacco    High Blood Pressure Mother     Cancer Father         prostate    Thyroid Disease Father     Anxiety Disorder Sister     No Known Problems Sister     Heart Disease Maternal Grandfather     No Known Problems Paternal Grandmother     No Known Problems Paternal Grandfather        Review of Systems  Constitutional: Negative for fever, malaise/fatigue and weight loss. HENT: Negative for sore throat and tinnitus. Eyes: Negative for blurred vision and double vision. Respiratory: Negative for shortness of breath. Negative for cough and wheezing. Cardiovascular: As mentioned in HPI. Gastrointestinal: Negative for abdominal pain, heartburn, nausea and vomiting. Genitourinary: Negative. Musculoskeletal: Negative for back pain, joint pain and myalgias. Neurological: Negative for dizziness, tremors, loss of consciousness and headaches. Endo/Heme/Allergies: Negative. Psychiatric/Behavioral: Negative for depression and suicidal ideas.     Physical Exam   BP (!) 104/58   Pulse 62   Resp 16   Ht 5' 3\" (1.6 m)   Wt 101 lb (45.8 kg)   SpO2 98% BMI 17.89 kg/m²   Constitutional: Oriented to person, place, and time. Well-developed and well-nourished. No distress. Head: Normocephalic and atraumatic. Eyes: EOM are normal. Pupils are equal, round, and reactive to light. Neck: Normal range of motion. Neck supple. No hepatojugular reflux and no JVD present. Carotid bruit is not present. No tracheal deviation present. No thyromegaly present. Cardiovascular: Normal rate, regular rhythm, normal heart sounds and intact distal pulses. Exam reveals no gallop and no friction rub. No murmur heard. Pulmonary/Chest: Effort normal and breath sounds normal. No respiratory distress. No wheezes. No rales. No tenderness. Abdominal: Soft. Bowel sounds are normal. No distension and no mass. No tenderness. No rebound and no guarding. Musculoskeletal: Normal range of motion. No edema and no tenderness. Lymphadenopathy:   No cervical adenopathy. Neurological: Alert and oriented to person, place, and time. Skin: Skin is warm and dry. No rash noted. Not diaphoretic. No erythema. Psychiatric: Normal mood and affect. Behavior is normal.     Procedures and Testing  EKG: Done in the office today and reviewed by me. Normal sinus rhythm. Short RI interval of 102 ms. No other abnormality seen. ASSESSMENT:   Diagnosis Orders   1. Palpitations  Cardiac event monitor      2. Heart problem  EKG 12 Lead      3. Other hyperlipidemia             Plan:  1. Palpitations: I will check a 1 week Holter monitor to look for any underlying arrhythmias as a cause of her symptoms. Reassurance provided. No need for therapeutic changes at this time. 2.  Hyperlipidemia: Lipid panel from 6/20/2022: 205/66/83/100. LDL is at goal.  Dietary counseling provided. She will follow-up with me in 6 months. Kt Malloy MD  [facility]) Cardiology     NOTE: This report was transcribed using voice recognition software.  Every effort was made to ensure accuracy; however, inadvertent computerized transcription errors may be present.

## 2022-08-09 NOTE — PROGRESS NOTES
Patient was seen in office today for the placement of a 7 day Heather Stanton Patient tolerated well & understood instructions.  Device #  [id number]

## 2022-08-13 LAB
THYROID PEROXIDASE (TPO) ABS: 40 IU/ML (ref 0–25)
THYROID STIMULATING IMMUNOGLOBULIN: 2.14 IU/L

## 2022-08-14 PROBLEM — E78.49 OTHER HYPERLIPIDEMIA: Status: ACTIVE | Noted: 2022-08-14

## 2022-08-16 DIAGNOSIS — E05.00 GRAVES DISEASE: Primary | ICD-10-CM

## 2022-08-16 DIAGNOSIS — R76.8 ANTI-TPO ANTIBODIES PRESENT: ICD-10-CM

## 2022-08-16 DIAGNOSIS — E05.90 SUBCLINICAL HYPERTHYROIDISM: ICD-10-CM

## 2022-08-16 DIAGNOSIS — E05.90 HYPERTHYROIDISM: ICD-10-CM

## 2022-12-12 ENCOUNTER — OFFICE VISIT (OUTPATIENT)
Dept: ENDOCRINOLOGY | Age: 42
End: 2022-12-12
Payer: MEDICAID

## 2022-12-12 VITALS
BODY MASS INDEX: 19.69 KG/M2 | SYSTOLIC BLOOD PRESSURE: 126 MMHG | HEART RATE: 85 BPM | WEIGHT: 107 LBS | HEIGHT: 62 IN | OXYGEN SATURATION: 98 % | DIASTOLIC BLOOD PRESSURE: 76 MMHG

## 2022-12-12 DIAGNOSIS — E05.00 GRAVES DISEASE: Primary | ICD-10-CM

## 2022-12-12 DIAGNOSIS — E05.00 GRAVES DISEASE: ICD-10-CM

## 2022-12-12 DIAGNOSIS — E05.90 HYPERTHYROIDISM: ICD-10-CM

## 2022-12-12 DIAGNOSIS — E55.9 VITAMIN D DEFICIENCY: ICD-10-CM

## 2022-12-12 LAB
T3 FREE: 2.8 PG/ML (ref 2–4.4)
T4 FREE: 1.28 NG/DL (ref 0.93–1.7)
TSH SERPL DL<=0.05 MIU/L-ACNC: 1.1 UIU/ML (ref 0.27–4.2)

## 2022-12-12 PROCEDURE — G8484 FLU IMMUNIZE NO ADMIN: HCPCS | Performed by: CLINICAL NURSE SPECIALIST

## 2022-12-12 PROCEDURE — 1036F TOBACCO NON-USER: CPT | Performed by: CLINICAL NURSE SPECIALIST

## 2022-12-12 PROCEDURE — G8427 DOCREV CUR MEDS BY ELIG CLIN: HCPCS | Performed by: CLINICAL NURSE SPECIALIST

## 2022-12-12 PROCEDURE — G8420 CALC BMI NORM PARAMETERS: HCPCS | Performed by: CLINICAL NURSE SPECIALIST

## 2022-12-12 PROCEDURE — 99204 OFFICE O/P NEW MOD 45 MIN: CPT | Performed by: CLINICAL NURSE SPECIALIST

## 2022-12-12 NOTE — PROGRESS NOTES
[address] [place] [address] Department of Endocrinology Diabetes and Metabolism   [address] [facility] [address]   Phone: [phone]  Fax: [phone]    Date of Service: 12/12/2022    Primary Care Physician: Malcolm Anderson. Gagan Carrasco MD  Referring physician: Wilman Sosa MD  Provider: JOS Leon     Reason for the visit:  Hyperthyrodisim      History of Present Illness: The history is provided by the patient. No  was used. Accuracy of the patient data is excellent. Ramana Brumfield is a very pleasant 40 y.o. female seen today for diabetes management     Patient diagnosed with hyperthyroidism 6/2022. Context: Was havinmg hot flashes and heart palpitations  Discovered on BW . TSH at that time was 0.13 (6/2022)  Symptoms associated with hyperthyroidism includes denies now    Tests the patient has completes  includes see below . Treatment the patient has done includes none . Last labs include TSH 0.16, free T4 1.26 (7/2022). TPO antibody 40.0 (8/2022)  TSI 2.14 (8/2022)     Thyroid ultrasound 7/2022 right lobe measuring 4.8 x 1.6 x 1.2 cm and left lobe measuring 4.0 x 1.5 x 1.2 cm, isthmus 1 mm  Heterogeneous and hypervascular thyroid gland, no nodules identified    Father has hyperthyroidism   No Biotin or B12 complex   No recent steroid use  No recent IV contrast     PAST MEDICAL HISTORY   Past Medical History:   Diagnosis Date    Acid reflux     Anxiety     Hyperthyroidism 8/16/2022    Other hyperlipidemia 8/14/2022    Vitamin D deficiency 6/1/2018       PAST SURGICAL HISTORY   Past Surgical History:   Procedure Laterality Date    TONSILLECTOMY         SOCIAL HISTORY   Tobacco:   reports that she has never smoked. She has never used smokeless tobacco.  Alcohol:   reports no history of alcohol use. Drugs:   reports no history of drug use.     FAMILY HISTORY   Family History   Problem Relation Age of Onset    Cancer Mother         lung ca    High Cholesterol Mother     Depression Mother     Anxiety Disorder Mother     Substance Abuse Mother         tobacco    High Blood Pressure Mother     Cancer Father         prostate    Thyroid Disease Father     Anxiety Disorder Sister     No Known Problems Sister     Heart Disease Maternal Grandfather     No Known Problems Paternal Grandmother     No Known Problems Paternal Grandfather        ALLERGIES AND DRUG REACTIONS   Allergies   Allergen Reactions    Septra [Bactrim]     Sulfa Antibiotics Hives       CURRENT MEDICATIONS   Current Outpatient Medications   Medication Sig Dispense Refill    omeprazole (PRILOSEC) 20 MG delayed release capsule Take 1 capsule by mouth daily 30 capsule 3    Multiple Vitamin (MULTI-VITAMIN DAILY PO) Take by mouth       No current facility-administered medications for this visit. Review of Systems  Constitutional: No fever, no chills, no diaphoresis, no generalized weakness. HEENT: No blurred vision, No sore throat, no ear pain, no hair loss  Neck: denied any neck swelling, difficulty swallowing,   Cardio-pulmonary: No CP, SOB or palpitation, No orthopnea or PND. No cough or wheezing. GI: No N/V/D, no constipation, No abdominal pain, no melena or hematochezia   : Denied any dysuria, hematuria, flank pain, discharge, or incontinence. Skin: denied any rash, ulcer, Hirsute, or hyperpigmentation. MSK: denied any joint deformity, joint pain/swelling, muscle pain, or back pain.   Neuro: no numbness, no tingling, no weakness, _    OBJECTIVE    /76   Pulse 85   Ht 5' 2\" (1.575 m)   Wt 107 lb (48.5 kg)   SpO2 98%   BMI 19.57 kg/m²   BP Readings from Last 4 Encounters:   12/12/22 126/76   08/09/22 (!) 104/58   07/13/22 98/60   06/20/22 111/73     Wt Readings from Last 6 Encounters:   12/12/22 107 lb (48.5 kg)   08/09/22 101 lb (45.8 kg)   07/13/22 104 lb (47.2 kg)   06/20/22 105 lb (47.6 kg)   12/11/19 120 lb (54.4 kg)   06/12/19 112 lb (50.8 kg)       Physical examination:  General: awake alert, oriented x3, no abnormal position or movements. HEENT: normocephalic non-traumatic, no exophthalmos   Neck: supple, no LN enlargement, Mild thyromegaly, no thyroid tenderness, no JVD. Pulm: Clear equal air entry no added sounds, no wheezing or rhonchi    CVS: S1 + S2, no murmur, no heave. Dorsalis pedis pulse palpable   Abd: soft lax, no tenderness, no organomegaly, audible bowel sounds. Skin: warm, no lesions, no rash.    Musculoskeletal: No back tenderness, no kyphosis/scoliosis    Neuro: CN intact,  , muscle power normal.  No tremors   Psych: normal mood, and affect      Review of Laboratory Data:  I personally reviewed the following lab:  Lab Results   Component Value Date/Time    WBC 4.1 (L) 06/20/2022 09:55 AM    RBC 5.48 06/20/2022 09:55 AM    HGB 14.2 06/20/2022 09:55 AM    HCT 44.8 06/20/2022 09:55 AM    MCV 81.8 06/20/2022 09:55 AM    MCH 25.9 (L) 06/20/2022 09:55 AM    MCHC 31.7 (L) 06/20/2022 09:55 AM    RDW 12.9 06/20/2022 09:55 AM     06/20/2022 09:55 AM    MPV 11.5 06/20/2022 09:55 AM      Lab Results   Component Value Date/Time     06/20/2022 09:55 AM    K 3.6 06/20/2022 09:55 AM    CO2 21 (L) 06/20/2022 09:55 AM    BUN 7 06/20/2022 09:55 AM    CREATININE 0.9 06/20/2022 09:55 AM    CALCIUM 9.4 06/20/2022 09:55 AM    LABGLOM >60 06/20/2022 09:55 AM    GFRAA >60 06/20/2022 09:55 AM      Lab Results   Component Value Date/Time    TSH 0.167 (L) 07/13/2022 12:00 PM    T4FREE 1.26 07/13/2022 12:00 PM    Z0HRCYN 9.8 11/20/2012 12:32 PM    U4PRYDW 112.9 11/20/2012 12:32 PM    TSI 2.14 (H) 08/09/2022 09:23 AM    TPOABS 40.0 (H) 08/09/2022 09:23 AM     Lab Results   Component Value Date/Time    LABA1C 5.0 12/11/2019 02:31 PM    GLUCOSE 85 06/20/2022 09:55 AM     Lab Results   Component Value Date/Time    LABA1C 5.0 12/11/2019 02:31 PM    LABA1C 5.2 11/20/2012 12:32 PM     Lab Results   Component Value Date/Time    TRIG 66 06/20/2022 09:55 AM    HDL 83 06/20/2022 09:55 AM    LDLCALC 109 06/20/2022 09:55 AM    CHOL 205 06/20/2022 09:55 AM     Lab Results   Component Value Date/Time    VITD25 23 06/20/2022 09:55 AM    VITD25 23 12/11/2019 03:00 PM       ASSESSMENT & RECOMMENDATIONS   Nay Lacey, a 40 y.o.-old female seen in for the following issues       Assessment:      Diagnosis Orders   1. Graves disease  TSH    T4, Free    T3, Free      2. Hyperthyroidism        3. Vitamin D deficiency            Plan:     1. Graves disease   Patient with subclinical hyperthyroidism related to Graves' disease  TSI is positive  Last TSH 0.16 with normal free T4  Counseled patient on when treatment is warranted when TSH is less than 0.1 or patient is symptomatic. Patient has no history of cardiac disease  Patient complaining of worsening hot flashes  Counseled on Graves' disease  Counseled on symptoms related to hyperthyroidism  Counseled on treatment options related to Graves' disease including antithyroid medication, radioactive iodine, and thyroidectomy  We will repeat TSH, free T4, and free T3  Further recommendations will be made after results are obtained  If TSH is low we will start low-dose methimazole and repeat TFTs in 6 weeks  Counseled on side effects related to methimazole use including liver dysfunction and a granulocytosis   2. Hyperthyroidism/subclinical  See above     3. Vitamin D deficiency  Continue vitamin D supplementation        I personally spent > 45 minutes reviewing external notes from PCP and other patient's care team providers, and personally interpreted labs associated with the above diagnosis. I also ordered labs to further assess and manage the above addressed medical conditions. Return in about 3 months (around 3/12/2023). The above issues were reviewed with the patient who understood and agreed with the plan. Thank you for allowing us to participate in the care of this patient.  Please do not hesitate to contact us with any additional questions. JOS Anderson     [facility] Endocrinology   [address] [place] [id number]   Phone: [phone]  Fax: [phone]  --------------------------------------------  An electronic signature was used to authenticate this note.  JOS Anderson on 12/12/2022 at 11:24 AM

## 2022-12-13 ENCOUNTER — TELEPHONE (OUTPATIENT)
Dept: ENDOCRINOLOGY | Age: 42
End: 2022-12-13

## 2022-12-13 DIAGNOSIS — E05.00 GRAVES DISEASE: Primary | ICD-10-CM

## 2022-12-13 NOTE — TELEPHONE ENCOUNTER
----- Message from JOS Pino sent at 12/13/2022  8:52 AM EST -----  Please call patient and inform her thyroid function is now normal  No need for treatment at this point  Please have patient repeat thyroid function testing in 6 weeks  Labs ordered

## 2023-02-16 ENCOUNTER — OFFICE VISIT (OUTPATIENT)
Dept: CARDIOLOGY CLINIC | Age: 43
End: 2023-02-16
Payer: MEDICAID

## 2023-02-16 VITALS
HEART RATE: 63 BPM | BODY MASS INDEX: 17.75 KG/M2 | SYSTOLIC BLOOD PRESSURE: 130 MMHG | WEIGHT: 104 LBS | DIASTOLIC BLOOD PRESSURE: 74 MMHG | RESPIRATION RATE: 16 BRPM | HEIGHT: 64 IN

## 2023-02-16 DIAGNOSIS — R00.2 PALPITATIONS: Primary | ICD-10-CM

## 2023-02-16 DIAGNOSIS — E78.49 OTHER HYPERLIPIDEMIA: ICD-10-CM

## 2023-02-16 PROCEDURE — 93000 ELECTROCARDIOGRAM COMPLETE: CPT | Performed by: INTERNAL MEDICINE

## 2023-02-16 PROCEDURE — G8419 CALC BMI OUT NRM PARAM NOF/U: HCPCS | Performed by: INTERNAL MEDICINE

## 2023-02-16 PROCEDURE — G8484 FLU IMMUNIZE NO ADMIN: HCPCS | Performed by: INTERNAL MEDICINE

## 2023-02-16 PROCEDURE — 99213 OFFICE O/P EST LOW 20 MIN: CPT | Performed by: INTERNAL MEDICINE

## 2023-02-16 PROCEDURE — G8427 DOCREV CUR MEDS BY ELIG CLIN: HCPCS | Performed by: INTERNAL MEDICINE

## 2023-02-16 PROCEDURE — 1036F TOBACCO NON-USER: CPT | Performed by: INTERNAL MEDICINE

## 2023-02-16 NOTE — PATIENT INSTRUCTIONS
Continue all your medications at current doses. I will let you know about the monitor results. Restrict sodium intake to less than 2-2.5 g/day. Restrict fluid intake to less than 2.2 L/day. Goal BP is less than 130/80. Please try to exercise for 150 minutes a week. Follow up as needed.

## 2023-02-16 NOTE — PROGRESS NOTES
CHIEF COMPLAINT:   Chief Complaint   Patient presents with    Palpitations     6 month- No cardiac complaints        HISTORY OF PRESENT ILLNESS: Patient is a 40 y.o. female seen at the request of Juan F Joseph MD to for a follow-up visit with me. She has a history of GERD, no prior significant cardiac history, anxiety not on medications. She had been referred to me for further evaluation of the above-mentioned complaints. Since her last visit, she has made some dietary modifications. Her palpitations have completely resolved. She only has occasional symptoms now. She reports no other complaints at today's office visit. \  At today's office visit, She denies any chest pain, shortness of breath,  pedal edema. The patient is capable of activities of daily living. There is dyspnea on more than moderate exertion. There is no orthopnea or PND. She is compliant with medications as well as diet and exercise regimen. Past Medical History:   Diagnosis Date    Acid reflux     Anxiety     Hyperthyroidism 8/16/2022    Other hyperlipidemia 8/14/2022    Vitamin D deficiency 6/1/2018       Allergies   Allergen Reactions    Septra [Bactrim]     Sulfa Antibiotics Hives       Current Outpatient Medications   Medication Sig Dispense Refill    omeprazole (PRILOSEC) 20 MG delayed release capsule Take 1 capsule by mouth daily 30 capsule 3    Multiple Vitamin (MULTI-VITAMIN DAILY PO) Take by mouth       No current facility-administered medications for this visit.        Social History     Socioeconomic History    Marital status: Single     Spouse name: Not on file    Number of children: Not on file    Years of education: Not on file    Highest education level: Not on file   Occupational History    Not on file   Tobacco Use    Smoking status: Never    Smokeless tobacco: Never   Substance and Sexual Activity    Alcohol use: No     Comment: (1/26/16):  no EtOH    Drug use: No    Sexual activity: Yes     Partners: Male     Comment: (1/26/16)monogamous relationship since 1998; condoms. not . have been together for 20 years    Other Topics Concern    Not on file   Social History Narrative    Not on file     Social Determinants of Health     Financial Resource Strain: Medium Risk    Difficulty of Paying Living Expenses: Somewhat hard   Food Insecurity: No Food Insecurity    Worried About Running Out of Food in the Last Year: Never true    Ran Out of Food in the Last Year: Never true   Transportation Needs: Not on file   Physical Activity: Not on file   Stress: Not on file   Social Connections: Not on file   Intimate Partner Violence: Not on file   Housing Stability: Not on file       Family History   Problem Relation Age of Onset    Cancer Mother         lung ca    High Cholesterol Mother     Depression Mother     Anxiety Disorder Mother     Substance Abuse Mother         tobacco    High Blood Pressure Mother     Cancer Father         prostate    Thyroid Disease Father     Anxiety Disorder Sister     No Known Problems Sister     Heart Disease Maternal Grandfather     No Known Problems Paternal Grandmother     No Known Problems Paternal Grandfather        Review of Systems  Constitutional: Negative for fever, malaise/fatigue and weight loss. HENT: Negative for sore throat and tinnitus. Eyes: Negative for blurred vision and double vision. Respiratory: Negative for shortness of breath. Negative for cough and wheezing. Cardiovascular: As mentioned in HPI. Gastrointestinal: Negative for abdominal pain, heartburn, nausea and vomiting. Genitourinary: Negative. Musculoskeletal: Negative for back pain, joint pain and myalgias. Neurological: Negative for dizziness, tremors, loss of consciousness and headaches. Endo/Heme/Allergies: Negative. Psychiatric/Behavioral: Negative for depression and suicidal ideas.     Physical Exam   /74   Pulse 63   Resp 16   Ht 5' 4\" (1.626 m)   Wt 104 lb (47.2 kg)   BMI 17.85 kg/m²   Constitutional: Oriented to person, place, and time. Well-developed and well-nourished. No distress. Head: Normocephalic and atraumatic. Eyes: EOM are normal. Pupils are equal, round, and reactive to light. Neck: Normal range of motion. Neck supple. No hepatojugular reflux and no JVD present. Carotid bruit is not present. No tracheal deviation present. No thyromegaly present. Cardiovascular: Normal rate, regular rhythm, normal heart sounds and intact distal pulses. Exam reveals no gallop and no friction rub. No murmur heard. Pulmonary/Chest: Effort normal and breath sounds normal. No respiratory distress. No wheezes. No rales. No tenderness. Abdominal: Soft. Bowel sounds are normal. No distension and no mass. No tenderness. No rebound and no guarding. Musculoskeletal: Normal range of motion. No edema and no tenderness. Lymphadenopathy:   No cervical adenopathy. Neurological: Alert and oriented to person, place, and time. Skin: Skin is warm and dry. No rash noted. Not diaphoretic. No erythema. Psychiatric: Normal mood and affect. Behavior is normal.     Procedures and Testing  EKG: Done in the office today and reviewed by me. Normal sinus rhythm. Short NY interval of 108 ms. No other abnormality seen. ASSESSMENT:   Diagnosis Orders   1. Palpitations  EKG 12 Lead      2. Other hyperlipidemia             Plan:  1. Palpitations: Her palpitations have resolved now. She did have a 1 week Holter monitor. I will try to find the report. No further work-up needed at this time. Reassurance provided. No need for therapeutic changes at this time. 2.  Hyperlipidemia: Lipid panel from 6/20/2022: 205/66/83/100. LDL is at goal.  Dietary counseling provided. She will follow-up with me in the office as needed for new or worsening symptoms. Counseled about compliance with diet and exercise. Encouraged to keep up the good work.       Bernarda Brooks MD  [facility]) Cardiology NOTE: This report was transcribed using voice recognition software. Every effort was made to ensure accuracy; however, inadvertent computerized transcription errors may be present.

## 2023-02-20 ENCOUNTER — TELEPHONE (OUTPATIENT)
Dept: CARDIOLOGY CLINIC | Age: 43
End: 2023-02-20

## 2023-02-20 DIAGNOSIS — R00.2 PALPITATIONS: ICD-10-CM

## 2023-02-20 NOTE — TELEPHONE ENCOUNTER
Eddie Apple Agent monitor put on in the [address] [place] [address] office 8-9-22   Scanned in Epic for your review

## 2023-02-20 NOTE — TELEPHONE ENCOUNTER
----- Message from Tasia Hsu MD sent at 2/17/2023  6:05 PM EST -----  It appears that she turned in her Holter monitor a few months ago. Could you please find it for us? Thank you. She will be following up with me as needed.

## 2023-03-17 DIAGNOSIS — E05.00 GRAVES DISEASE: ICD-10-CM

## 2023-03-17 LAB
T3FREE SERPL-MCNC: 2.7 PG/ML (ref 2–4.4)
T4 FREE SERPL-MCNC: 1.12 NG/DL (ref 0.93–1.7)
TSH SERPL-MCNC: 1.82 UIU/ML (ref 0.27–4.2)

## 2023-03-20 ENCOUNTER — OFFICE VISIT (OUTPATIENT)
Dept: ENDOCRINOLOGY | Age: 43
End: 2023-03-20
Payer: MEDICAID

## 2023-03-20 VITALS
RESPIRATION RATE: 16 BRPM | OXYGEN SATURATION: 98 % | HEIGHT: 64 IN | HEART RATE: 77 BPM | WEIGHT: 106 LBS | DIASTOLIC BLOOD PRESSURE: 83 MMHG | SYSTOLIC BLOOD PRESSURE: 119 MMHG | BODY MASS INDEX: 18.1 KG/M2

## 2023-03-20 DIAGNOSIS — E55.9 VITAMIN D DEFICIENCY: ICD-10-CM

## 2023-03-20 DIAGNOSIS — E05.00 GRAVES DISEASE: Primary | ICD-10-CM

## 2023-03-20 DIAGNOSIS — E05.90 HYPERTHYROIDISM: ICD-10-CM

## 2023-03-20 PROCEDURE — G8484 FLU IMMUNIZE NO ADMIN: HCPCS | Performed by: CLINICAL NURSE SPECIALIST

## 2023-03-20 PROCEDURE — 99214 OFFICE O/P EST MOD 30 MIN: CPT | Performed by: CLINICAL NURSE SPECIALIST

## 2023-03-20 PROCEDURE — G8419 CALC BMI OUT NRM PARAM NOF/U: HCPCS | Performed by: CLINICAL NURSE SPECIALIST

## 2023-03-20 PROCEDURE — G8427 DOCREV CUR MEDS BY ELIG CLIN: HCPCS | Performed by: CLINICAL NURSE SPECIALIST

## 2023-03-20 PROCEDURE — 1036F TOBACCO NON-USER: CPT | Performed by: CLINICAL NURSE SPECIALIST

## 2023-10-03 ENCOUNTER — OFFICE VISIT (OUTPATIENT)
Dept: ENDOCRINOLOGY | Age: 43
End: 2023-10-03
Payer: MEDICAID

## 2023-10-03 VITALS
SYSTOLIC BLOOD PRESSURE: 130 MMHG | HEIGHT: 64 IN | BODY MASS INDEX: 18.1 KG/M2 | HEART RATE: 79 BPM | WEIGHT: 106 LBS | DIASTOLIC BLOOD PRESSURE: 76 MMHG

## 2023-10-03 DIAGNOSIS — E05.90 HYPERTHYROIDISM: ICD-10-CM

## 2023-10-03 DIAGNOSIS — E55.9 VITAMIN D DEFICIENCY: ICD-10-CM

## 2023-10-03 DIAGNOSIS — E05.00 GRAVES DISEASE: ICD-10-CM

## 2023-10-03 DIAGNOSIS — E05.00 GRAVES DISEASE: Primary | ICD-10-CM

## 2023-10-03 LAB
T4 FREE: 1.1 NG/DL (ref 0.9–1.7)
TSH SERPL DL<=0.05 MIU/L-ACNC: 0.94 UIU/ML (ref 0.27–4.2)

## 2023-10-03 PROCEDURE — G8484 FLU IMMUNIZE NO ADMIN: HCPCS | Performed by: CLINICAL NURSE SPECIALIST

## 2023-10-03 PROCEDURE — G8427 DOCREV CUR MEDS BY ELIG CLIN: HCPCS | Performed by: CLINICAL NURSE SPECIALIST

## 2023-10-03 PROCEDURE — 99213 OFFICE O/P EST LOW 20 MIN: CPT | Performed by: CLINICAL NURSE SPECIALIST

## 2023-10-03 PROCEDURE — G8419 CALC BMI OUT NRM PARAM NOF/U: HCPCS | Performed by: CLINICAL NURSE SPECIALIST

## 2023-10-03 PROCEDURE — 1036F TOBACCO NON-USER: CPT | Performed by: CLINICAL NURSE SPECIALIST

## 2023-10-03 NOTE — PROGRESS NOTES
[phone]  --------------------------------------------  An electronic signature was used to authenticate this note.  Sunil Mcknight, JOS - CNS on 10/3/2023 at 10:37 AM

## 2023-10-04 ENCOUNTER — TELEPHONE (OUTPATIENT)
Dept: ENDOCRINOLOGY | Age: 43
End: 2023-10-04

## 2023-10-04 LAB — VITAMIN D 25-HYDROXY: 18 NG/ML (ref 30–100)

## 2023-10-04 RX ORDER — ERGOCALCIFEROL 1.25 MG/1
50000 CAPSULE ORAL WEEKLY
Qty: 4 CAPSULE | Refills: 1 | Status: SHIPPED | OUTPATIENT
Start: 2023-10-04

## 2023-10-04 NOTE — TELEPHONE ENCOUNTER
----- Message from JOS Luu sent at 10/4/2023  7:45 AM EDT -----  Please call patient and inform her vitamin D is low. Please have patient start Drisdol 50,000 IU 1 tablet once weekly for 8 weeks. Patient can then take vitamin D 2000 IU daily over-the-counter thereafter.   Please inform patient thyroid function is normal

## 2023-10-04 NOTE — TELEPHONE ENCOUNTER
Lm on pt's vm; low vit d level/medication instructions provided. Thyroid function good. Encouraged pt to cll w/ any further question.

## 2023-10-06 LAB — THYROID STIMULATING IMMUNOGLOB: 0.64 IU/L

## 2023-10-09 ENCOUNTER — TELEPHONE (OUTPATIENT)
Dept: ENDOCRINOLOGY | Age: 43
End: 2023-10-09

## 2023-10-09 NOTE — TELEPHONE ENCOUNTER
Lm on pt's vm - notified of results, no change in treatment plan. Encouraged pt to call office with further questions.

## 2023-10-09 NOTE — TELEPHONE ENCOUNTER
----- Message from JOS Cruz sent at 10/9/2023  8:41 AM EDT -----  Please call patient and inform her TSI is still mildly elevated. This does not affect treatment plan.   Continue with plan as discussed in office visit

## 2024-05-16 LAB — PAP SMEAR, EXTERNAL: NEGATIVE

## 2024-05-20 SDOH — HEALTH STABILITY: PHYSICAL HEALTH: ON AVERAGE, HOW MANY MINUTES DO YOU ENGAGE IN EXERCISE AT THIS LEVEL?: 30 MIN

## 2024-05-20 SDOH — HEALTH STABILITY: PHYSICAL HEALTH: ON AVERAGE, HOW MANY DAYS PER WEEK DO YOU ENGAGE IN MODERATE TO STRENUOUS EXERCISE (LIKE A BRISK WALK)?: 5 DAYS

## 2024-05-22 ENCOUNTER — OFFICE VISIT (OUTPATIENT)
Dept: FAMILY MEDICINE CLINIC | Age: 44
End: 2024-05-22
Payer: MEDICAID

## 2024-05-22 VITALS
DIASTOLIC BLOOD PRESSURE: 72 MMHG | HEART RATE: 68 BPM | TEMPERATURE: 97.8 F | SYSTOLIC BLOOD PRESSURE: 102 MMHG | RESPIRATION RATE: 16 BRPM | WEIGHT: 105.8 LBS | HEIGHT: 62 IN | BODY MASS INDEX: 19.47 KG/M2 | OXYGEN SATURATION: 100 %

## 2024-05-22 DIAGNOSIS — D72.819 LEUKOPENIA, UNSPECIFIED TYPE: ICD-10-CM

## 2024-05-22 DIAGNOSIS — K21.9 GASTROESOPHAGEAL REFLUX DISEASE, UNSPECIFIED WHETHER ESOPHAGITIS PRESENT: ICD-10-CM

## 2024-05-22 DIAGNOSIS — E55.9 VITAMIN D DEFICIENCY: ICD-10-CM

## 2024-05-22 DIAGNOSIS — E05.90 HYPERTHYROIDISM: ICD-10-CM

## 2024-05-22 DIAGNOSIS — E78.2 MODERATE MIXED HYPERLIPIDEMIA NOT REQUIRING STATIN THERAPY: Primary | ICD-10-CM

## 2024-05-22 PROCEDURE — G8420 CALC BMI NORM PARAMETERS: HCPCS | Performed by: FAMILY MEDICINE

## 2024-05-22 PROCEDURE — 1036F TOBACCO NON-USER: CPT | Performed by: FAMILY MEDICINE

## 2024-05-22 PROCEDURE — 99214 OFFICE O/P EST MOD 30 MIN: CPT | Performed by: FAMILY MEDICINE

## 2024-05-22 PROCEDURE — G8427 DOCREV CUR MEDS BY ELIG CLIN: HCPCS | Performed by: FAMILY MEDICINE

## 2024-05-22 RX ORDER — FAMOTIDINE 20 MG/1
20 TABLET, FILM COATED ORAL 2 TIMES DAILY PRN
Qty: 60 TABLET | Refills: 3 | Status: SHIPPED | OUTPATIENT
Start: 2024-05-22

## 2024-05-22 RX ORDER — ERGOCALCIFEROL 1.25 MG/1
50000 CAPSULE ORAL WEEKLY
Qty: 12 CAPSULE | Refills: 0 | Status: SHIPPED | OUTPATIENT
Start: 2024-05-22

## 2024-05-22 SDOH — ECONOMIC STABILITY: FOOD INSECURITY: WITHIN THE PAST 12 MONTHS, YOU WORRIED THAT YOUR FOOD WOULD RUN OUT BEFORE YOU GOT MONEY TO BUY MORE.: NEVER TRUE

## 2024-05-22 SDOH — ECONOMIC STABILITY: HOUSING INSECURITY
IN THE LAST 12 MONTHS, WAS THERE A TIME WHEN YOU DID NOT HAVE A STEADY PLACE TO SLEEP OR SLEPT IN A SHELTER (INCLUDING NOW)?: NO

## 2024-05-22 SDOH — ECONOMIC STABILITY: INCOME INSECURITY: HOW HARD IS IT FOR YOU TO PAY FOR THE VERY BASICS LIKE FOOD, HOUSING, MEDICAL CARE, AND HEATING?: NOT HARD AT ALL

## 2024-05-22 SDOH — ECONOMIC STABILITY: FOOD INSECURITY: WITHIN THE PAST 12 MONTHS, THE FOOD YOU BOUGHT JUST DIDN'T LAST AND YOU DIDN'T HAVE MONEY TO GET MORE.: NEVER TRUE

## 2024-05-22 ASSESSMENT — PATIENT HEALTH QUESTIONNAIRE - PHQ9
SUM OF ALL RESPONSES TO PHQ QUESTIONS 1-9: 0
1. LITTLE INTEREST OR PLEASURE IN DOING THINGS: NOT AT ALL
2. FEELING DOWN, DEPRESSED OR HOPELESS: NOT AT ALL
SUM OF ALL RESPONSES TO PHQ9 QUESTIONS 1 & 2: 0
SUM OF ALL RESPONSES TO PHQ QUESTIONS 1-9: 0

## 2024-05-22 NOTE — PATIENT INSTRUCTIONS
Patient Education        Learning About Being Physically Active  What is physical activity?     Being physically active means doing any kind of activity that gets your body moving.  The types of physical activity that can help you get fit and stay healthy include:  Aerobic or \"cardio\" activities. These make your heart beat faster and make you breathe harder, such as brisk walking, riding a bike, or running. They strengthen your heart and lungs and build up your endurance.  Strength training activities. These make your muscles work against, or \"resist,\" something. Examples include lifting weights or doing push-ups. These activities help tone and strengthen your muscles and bones.  Stretches. These let you move your joints and muscles through their full range of motion. Stretching helps you be more flexible.  Reaching a balance between these three types of physical activity is important because each one contributes to your overall fitness.  What are the benefits of being active?  Being active is one of the best things you can do for your health. It helps you to:  Feel stronger and have more energy to do all the things you like to do.  Focus better at school or work.  Feel, think, and sleep better.  Reach and stay at a healthy weight.  Lose fat and build lean muscle.  Lower your risk for serious health problems, including diabetes, heart attack, high blood pressure, and some cancers.  Keep your heart, lungs, bones, muscles, and joints strong and healthy.  How can you make being active part of your life?  Start slowly. Make it your long-term goal to get at least 30 minutes of exercise on most days of the week. Walking is a good choice. You also may want to do other activities, such as running, swimming, cycling, or playing tennis or team sports.  Pick activities that you like--ones that make your heart beat faster, your muscles stronger, and your muscles and joints more flexible. If you find more than one thing you like

## 2024-05-22 NOTE — PROGRESS NOTES
[facility]  [address] [facility] [address] [facility] [address] [place] [address]  Phone: [phone]        Patient:  Gail Adame 44 y.o. female          Chief complaint:   Chief Complaint   Patient presents with    New Patient       Assessment and Plan     Gail was seen today for new patient.    Diagnoses and all orders for this visit:    Moderate mixed hyperlipidemia not requiring statin therapy  Lab Results   Component Value Date    CHOL 205 (H) 06/20/2022    TRIG 66 06/20/2022    HDL 83 06/20/2022     (H) 06/20/2022    VLDL 13 06/20/2022   Chronic and not well-controlled  -     Lipid, Fasting; Future  -     Comprehensive Metabolic Panel; Future    Vitamin D deficiency  Lab Results   Component Value Date/Time    VITD25 18.0 10/03/2023 11:40 AM   Chronic and not well-controlled  -     Vitamin D 25 Hydroxy; Future  -     vitamin D (ERGOCALCIFEROL) 1.25 MG (88420 UT) CAPS capsule; Take 1 capsule by mouth once a week When you finish this prescription, you should get an over the counter supplement for 2000IU daily.    Hyperthyroidism  Lab Results   Component Value Date    TSH 0.94 10/03/2023   Chronic and stable  Following with endocrinology  Not requiring medications at this time    Leukopenia, unspecified type  Chronic and stable  -     CBC with Auto Differential; Future    Gastroesophageal reflux disease, unspecified whether esophagitis present  Chronic and stable  Stop omeprazole at this time  Trial H2 blocker as needed  -     famotidine (PEPCID) 20 MG tablet; Take 1 tablet by mouth 2 times daily as needed (heart burn)          Please see Patient Instructions for further counseling and information given.        Advised to please be adherent to the treatment plans discussed today, and please call with any questions or concerns, letting the office know of any reasons that the plans may not be followed.  The risks of untreated conditions include worsening illness, injury, disability, and possibly,

## 2024-05-28 DIAGNOSIS — E55.9 VITAMIN D DEFICIENCY: ICD-10-CM

## 2024-05-28 DIAGNOSIS — E78.2 MODERATE MIXED HYPERLIPIDEMIA NOT REQUIRING STATIN THERAPY: ICD-10-CM

## 2024-05-28 DIAGNOSIS — D72.819 LEUKOPENIA, UNSPECIFIED TYPE: ICD-10-CM

## 2024-05-28 LAB
ALBUMIN: 4.4 G/DL (ref 3.5–5.2)
ALP BLD-CCNC: 54 U/L (ref 35–104)
ALT SERPL-CCNC: 10 U/L (ref 0–32)
ANION GAP SERPL CALCULATED.3IONS-SCNC: 14 MMOL/L (ref 7–16)
AST SERPL-CCNC: 16 U/L (ref 0–31)
BASOPHILS ABSOLUTE: 0.04 K/UL (ref 0–0.2)
BASOPHILS RELATIVE PERCENT: 1 % (ref 0–2)
BILIRUB SERPL-MCNC: 0.5 MG/DL (ref 0–1.2)
BUN BLDV-MCNC: 8 MG/DL (ref 6–20)
CALCIUM SERPL-MCNC: 8.9 MG/DL (ref 8.6–10.2)
CHLORIDE BLD-SCNC: 106 MMOL/L (ref 98–107)
CHOLESTEROL, FASTING: 198 MG/DL
CO2: 22 MMOL/L (ref 22–29)
CREAT SERPL-MCNC: 0.8 MG/DL (ref 0.5–1)
EOSINOPHILS ABSOLUTE: 0.07 K/UL (ref 0.05–0.5)
EOSINOPHILS RELATIVE PERCENT: 1 % (ref 0–6)
GFR, ESTIMATED: >90 ML/MIN/1.73M2
GLUCOSE BLD-MCNC: 84 MG/DL (ref 74–99)
HCT VFR BLD CALC: 43.4 % (ref 34–48)
HDLC SERPL-MCNC: 86 MG/DL
HEMOGLOBIN: 13.7 G/DL (ref 11.5–15.5)
IMMATURE GRANULOCYTES %: 0 % (ref 0–5)
IMMATURE GRANULOCYTES ABSOLUTE: <0.03 K/UL (ref 0–0.58)
LDL CHOLESTEROL: 101 MG/DL
LYMPHOCYTES ABSOLUTE: 1.63 K/UL (ref 1.5–4)
LYMPHOCYTES RELATIVE PERCENT: 33 % (ref 20–42)
MCH RBC QN AUTO: 26.5 PG (ref 26–35)
MCHC RBC AUTO-ENTMCNC: 31.6 G/DL (ref 32–34.5)
MCV RBC AUTO: 83.9 FL (ref 80–99.9)
MONOCYTES ABSOLUTE: 0.35 K/UL (ref 0.1–0.95)
MONOCYTES RELATIVE PERCENT: 7 % (ref 2–12)
NEUTROPHILS ABSOLUTE: 2.8 K/UL (ref 1.8–7.3)
NEUTROPHILS RELATIVE PERCENT: 57 % (ref 43–80)
PDW BLD-RTO: 13.1 % (ref 11.5–15)
PLATELET # BLD: 235 K/UL (ref 130–450)
PMV BLD AUTO: 11 FL (ref 7–12)
POTASSIUM SERPL-SCNC: 3.9 MMOL/L (ref 3.5–5)
RBC # BLD: 5.17 M/UL (ref 3.5–5.5)
SODIUM BLD-SCNC: 142 MMOL/L (ref 132–146)
TOTAL PROTEIN: 7.2 G/DL (ref 6.4–8.3)
TRIGLYCERIDE, FASTING: 53 MG/DL
VITAMIN D 25-HYDROXY: 12.8 NG/ML (ref 30–100)
VLDLC SERPL CALC-MCNC: 11 MG/DL
WBC # BLD: 4.9 K/UL (ref 4.5–11.5)